# Patient Record
Sex: FEMALE | Race: WHITE
[De-identification: names, ages, dates, MRNs, and addresses within clinical notes are randomized per-mention and may not be internally consistent; named-entity substitution may affect disease eponyms.]

---

## 2022-10-12 ENCOUNTER — HOSPITAL ENCOUNTER (EMERGENCY)
Dept: HOSPITAL 75 - ER | Age: 22
Discharge: HOME | End: 2022-10-12
Payer: MEDICAID

## 2022-10-12 VITALS — SYSTOLIC BLOOD PRESSURE: 120 MMHG | DIASTOLIC BLOOD PRESSURE: 78 MMHG

## 2022-10-12 VITALS — HEIGHT: 62.99 IN | BODY MASS INDEX: 45.31 KG/M2 | WEIGHT: 255.74 LBS

## 2022-10-12 DIAGNOSIS — Z32.02: ICD-10-CM

## 2022-10-12 DIAGNOSIS — N92.0: Primary | ICD-10-CM

## 2022-10-12 DIAGNOSIS — Z87.42: ICD-10-CM

## 2022-10-12 LAB
BASOPHILS # BLD AUTO: 0.1 10^3/UL (ref 0–0.1)
BASOPHILS NFR BLD AUTO: 1 % (ref 0–10)
BUN/CREAT SERPL: 14
CALCIUM SERPL-MCNC: 9.1 MG/DL (ref 8.5–10.1)
CHLORIDE SERPL-SCNC: 108 MMOL/L (ref 98–107)
CO2 SERPL-SCNC: 23 MMOL/L (ref 21–32)
CREAT SERPL-MCNC: 0.79 MG/DL (ref 0.6–1.3)
EOSINOPHIL # BLD AUTO: 0.1 10^3/UL (ref 0–0.3)
EOSINOPHIL NFR BLD AUTO: 1 % (ref 0–10)
GFR SERPLBLD BASED ON 1.73 SQ M-ARVRAT: 108 ML/MIN
GLUCOSE SERPL-MCNC: 88 MG/DL (ref 70–105)
HCT VFR BLD CALC: 40 % (ref 35–52)
HGB BLD-MCNC: 13.7 G/DL (ref 11.5–16)
LYMPHOCYTES # BLD AUTO: 1.9 10^3/UL (ref 1–4)
LYMPHOCYTES NFR BLD AUTO: 24 % (ref 12–44)
MANUAL DIFFERENTIAL PERFORMED BLD QL: NO
MCH RBC QN AUTO: 28 PG (ref 25–34)
MCHC RBC AUTO-ENTMCNC: 34 G/DL (ref 32–36)
MCV RBC AUTO: 83 FL (ref 80–99)
MONOCYTES # BLD AUTO: 0.4 10^3/UL (ref 0–1)
MONOCYTES NFR BLD AUTO: 5 % (ref 0–12)
NEUTROPHILS # BLD AUTO: 5.6 10^3/UL (ref 1.8–7.8)
NEUTROPHILS NFR BLD AUTO: 69 % (ref 42–75)
PLATELET # BLD: 247 10^3/UL (ref 130–400)
PMV BLD AUTO: 11.8 FL (ref 9–12.2)
POTASSIUM SERPL-SCNC: 4 MMOL/L (ref 3.6–5)
SODIUM SERPL-SCNC: 140 MMOL/L (ref 135–145)
WBC # BLD AUTO: 8.1 10^3/UL (ref 4.3–11)

## 2022-10-12 PROCEDURE — 36415 COLL VENOUS BLD VENIPUNCTURE: CPT

## 2022-10-12 PROCEDURE — 85025 COMPLETE CBC W/AUTO DIFF WBC: CPT

## 2022-10-12 PROCEDURE — 84703 CHORIONIC GONADOTROPIN ASSAY: CPT

## 2022-10-12 PROCEDURE — 80048 BASIC METABOLIC PNL TOTAL CA: CPT

## 2022-10-12 NOTE — ED GU-FEMALE
General


Chief Complaint:   - Reproductive


Stated Complaint:  VAG BLEEDING/CRAMPS


Source:  patient


Exam Limitations:  no limitations





History of Present Illness


Date Seen by Provider:  Oct 12, 2022


Time Seen by Provider:  08:27


Initial Comments


22-year-old female with history of PCOS, endometriosis presents to the emergency

department today for heavy menses.  She states.  Started on Sunday which was 

normal timing for her.  Her bleeding has been significant with clots up to golf 

ball size.  She states she is having to use tampons and pads and changing them 

very frequently, maybe every 30 minutes or so.  She has lower abdominal and low 

back cramping consistent with previous menstrual cramps.  She denies fevers or 

chills.  No new sexual partners and she and her  are trying to get 

pregnant.  She had a pregnancy test on Monday with her endocrinologist, Dr. Nguyen at [place].  She also had an endometrial biopsy as she had some 

abnormalities on an ultrasound per her report.  No urinary symptoms.  No changes

in bowel habits.





Allergies and Home Medications


Allergies


Coded Allergies:  


     No Known Drug Allergies (Unverified , 3/23/16)





Patient Home Medication List


Home Medication List Reviewed:  Yes


Fluoxetine HCl (Prozac) 10 Mg Capsule, 10 MG PO DAILY, (Reported)


   Entered as Reported by: MIKE OBANDO on 3/23/16 1740


[Birth Control]  , (Reported)


   Entered as Reported by: MIKE OBANOD on 3/23/16 1740





Review of Systems


Review of Systems


Constitutional:  no symptoms reported


EENTM:  no symptoms reported


Respiratory:  no symptoms reported


Cardiovascular:  no symptoms reported


Genitourinary:  other (Heavy menstrual bleeding)


Musculoskeletal:  no symptoms reported


Psychiatric/Neurological:  No Symptoms Reported


Endocrine:  No Symptoms Reported


Hematologic/Lymphatic:  No Symptoms Reported





Past Medical-Social-Family Hx


Patient Social History


Tobacco Use?:  No


Use of E-Cig and/or Vaping dev:  No


Substance use?:  No


Alcohol Use?:  No





Immunizations Up To Date


Tetanus Booster (TDap):  Less than 5yrs





Seasonal Allergies


Seasonal Allergies:  No





Past Medical History


Reproductive Disorders:  No


Anxiety, Depression





Family Medical History


Reviewed Nursing Family Hx





Hypercholesterolemia


  19 FATHER


Hypertension


  19 FATHER


No Pertinent Family Hx





Physical Exam


Vital Signs





Vital Signs - First Documented








 10/12/22





 08:25


 


Temp 36.3


 


Pulse 111


 


Resp 16


 


B/P (MAP) 134/87 (103)


 


Pulse Ox 96


 


O2 Delivery Room Air





Capillary Refill :


Height, Weight, BMI


Height: 5'2.00"


Weight: 203lbs. 0.0oz. [id number]; 37.1 BMI


Method:Stated


General Appearance:  WD/WN, no apparent distress


HEENT:  PERRL/EOMI, normal ENT inspection, pharynx normal


Neck:  non-tender, supple, normal inspection


Cardiovascular:  no edema, no gallop, no JVD, tachycardia


Respiratory:  chest non-tender, lungs clear, normal breath sounds, no respir

atory distress, no accessory muscle use


Gastrointestinal:  normal bowel sounds, non tender, soft, no organomegaly, no 

pulsatile mass


Back:  normal inspection, no CVA tenderness


Extremities:  normal range of motion, non-tender, normal inspection, no pedal 

edema, no calf tenderness


Neurologic/Psychiatric:  alert, normal mood/affect, oriented x 3


Skin:  normal color, warm/dry


Lymphatic:  no adenopathy





Progress/Results/Core Measures


Suspected Sepsis


SIRS


Temperature: 


Pulse:  


Respiratory Rate: 


 


Laboratory Tests


10/12/22 08:45: White Blood Count 8.1


Blood Pressure  / 


Mean: 


 











Laboratory Tests


10/12/22 08:45: 


Creatinine 0.79, Platelet Count 247





Results/Orders


Lab Results





Laboratory Tests








Test


 10/12/22


08:45 Range/Units


 


 


White Blood Count


 8.1 


 4.3-11.0


10^3/uL


 


Red Blood Count


 4.83 


 3.80-5.11


10^6/uL


 


Hemoglobin 13.7  11.5-16.0  g/dL


 


Hematocrit 40  35-52  %


 


Mean Corpuscular Volume 83  80-99  fL


 


Mean Corpuscular Hemoglobin 28  25-34  pg


 


Mean Corpuscular Hemoglobin


Concent 34 


 32-36  g/dL





 


Red Cell Distribution Width 12.3  10.0-14.5  %


 


Platelet Count


 247 


 130-400


10^3/uL


 


Mean Platelet Volume 11.8  9.0-12.2  fL


 


Immature Granulocyte % (Auto) 0   %


 


Neutrophils (%) (Auto) 69  42-75  %


 


Lymphocytes (%) (Auto) 24  12-44  %


 


Monocytes (%) (Auto) 5  0-12  %


 


Eosinophils (%) (Auto) 1  0-10  %


 


Basophils (%) (Auto) 1  0-10  %


 


Neutrophils # (Auto)


 5.6 


 1.8-7.8


10^3/uL


 


Lymphocytes # (Auto)


 1.9 


 1.0-4.0


10^3/uL


 


Monocytes # (Auto)


 0.4 


 0.0-1.0


10^3/uL


 


Eosinophils # (Auto)


 0.1 


 0.0-0.3


10^3/uL


 


Basophils # (Auto)


 0.1 


 0.0-0.1


10^3/uL


 


Immature Granulocyte # (Auto)


 0.0 


 0.0-0.1


10^3/uL


 


Sodium Level 140  135-145  MMOL/L


 


Potassium Level 4.0  3.6-5.0  MMOL/L


 


Chloride Level 108 H   MMOL/L


 


Carbon Dioxide Level 23  21-32  MMOL/L


 


Anion Gap 9  5-14  MMOL/L


 


Blood Urea Nitrogen 11  7-18  MG/DL


 


Creatinine


 0.79 


 0.60-1.30


MG/DL


 


Estimat Glomerular Filtration


Rate 108 


  





 


BUN/Creatinine Ratio 14   


 


Glucose Level 88    MG/DL


 


Calcium Level 9.1  8.5-10.1  MG/DL


 


Serum Pregnancy Test,


Qualitative NEGATIVE 


 NEGATIVE  











My Orders





Orders - VERENA LEÓN DO


Cbc With Automated Diff (10/12/22 08:34)


Basic Metabolic Panel (10/12/22 08:34)


Hcg,Qualitative Serum (10/12/22 08:34)


Ns Iv 1000 Ml (Sodium Chloride 0.9%) (10/12/22 08:45)





Vital Signs/I&O











 10/12/22





 08:25


 


Temp 36.3


 


Pulse 111


 


Resp 16


 


B/P (MAP) 134/87 (103)


 


Pulse Ox 96


 


O2 Delivery Room Air





Capillary Refill :





Departure


Communication (Admissions)


Patient is initially slightly tachycardic.  I think this is at least in part due

to anxiousness however I gave her IV fluids and it did improve with time and 

fluids.  No evidence for anemia.  She just had a pelvic exam by her gynecologist

and biopsy.  I think symptoms are likely related to her recent biopsy and 

irritation of the endometrium.  I do not think repeat pelvic exam would be 

helpful at this time.  She has no other vaginal symptoms no new sexual partners 

and she and her  are trying to get pregnant.  We will start birth control

due to this.  Recommend she follow-up with Dr. Nguyen should her symptoms 

persist.  She states understanding.  She is discharged home with strict return 

precautions for dizziness, lightheadedness or if her symptoms change in any way 

concerning.





Impression





   Primary Impression:  


   Menorrhagia


   Qualified Codes:  N92.0 - Excessive and frequent menstruation with regular 

   cycle


Disposition:  01 HOME, SELF-CARE


Condition:  Stable





Departure-Patient Inst.


Referrals:  


NO,LOCAL PHYSICIAN (PCP)


Primary Care Physician


Patient Instructions:  Heavy Periods





Add. Discharge Instructions:  


Please call to schedule follow-up appoint with Dr. Nguyen, especially if your 

symptoms persist.  Return to the emergency department immediately for any 

shortness of breath, dizziness or if your symptoms change in any way otherwise 

concerning to





All discharge instructions reviewed with patient and/or family. Voiced 

understanding.











VERENA LEÓN DO            Oct 12, 2022 08:38